# Patient Record
Sex: FEMALE | Race: WHITE | Employment: UNEMPLOYED | URBAN - METROPOLITAN AREA
[De-identification: names, ages, dates, MRNs, and addresses within clinical notes are randomized per-mention and may not be internally consistent; named-entity substitution may affect disease eponyms.]

---

## 2020-09-25 ENCOUNTER — HOSPITAL ENCOUNTER (OUTPATIENT)
Dept: RADIATION ONCOLOGY | Age: 75
Discharge: HOME | End: 2020-09-25
Payer: MEDICARE

## 2020-09-25 VITALS
SYSTOLIC BLOOD PRESSURE: 176 MMHG | HEART RATE: 72 BPM | TEMPERATURE: 98 F | OXYGEN SATURATION: 95 % | DIASTOLIC BLOOD PRESSURE: 80 MMHG | WEIGHT: 164 LBS | RESPIRATION RATE: 18 BRPM

## 2020-09-25 PROCEDURE — 99202 OFFICE O/P NEW SF 15 MIN: CPT | Performed by: RADIOLOGY

## 2020-09-25 PROCEDURE — 99204 OFFICE O/P NEW MOD 45 MIN: CPT | Performed by: RADIOLOGY

## 2020-09-25 RX ORDER — FAMOTIDINE 20 MG/1
20 TABLET, FILM COATED ORAL 2 TIMES DAILY PRN
COMMUNITY

## 2020-09-25 RX ORDER — FLUTICASONE PROPIONATE 50 MCG
1 SPRAY, SUSPENSION (ML) NASAL DAILY
COMMUNITY

## 2020-09-25 RX ORDER — SIMVASTATIN 20 MG
20 TABLET ORAL NIGHTLY
COMMUNITY

## 2020-09-25 RX ORDER — CITALOPRAM 20 MG/1
20 TABLET ORAL DAILY
COMMUNITY

## 2020-09-25 RX ORDER — MECLIZINE HCL 12.5 MG/1
12.5 TABLET ORAL
COMMUNITY

## 2020-09-25 NOTE — PROGRESS NOTES
Scottbelkys [place]  9/25/2020    Chaperone: No    Advanced Directives     Power of  Living Will    Not on File Not on File          Temp Readings from Last 2 Encounters:   09/25/20 98 °F (36.7 °C) (Infrared)     BP Readings from Last 2 Encounters:   09/25/20 (!) 176/80     Pulse Readings from Last 2 Encounters:   09/25/20 72        Wt Readings from Last 3 Encounters:   09/25/20 164 lb (74.4 kg)        Mediport: no    Pacemaker/ICD: no    Previous XRT: no    No Known Allergies       Current Outpatient Medications:     citalopram (CELEXA) 20 MG tablet, Take 20 mg by mouth daily, Disp: , Rfl:     fluticasone (FLONASE) 50 MCG/ACT nasal spray, 1 spray by Each Nostril route daily, Disp: , Rfl:     meclizine (ANTIVERT) 12.5 MG tablet, Take 12.5 mg by mouth, Disp: , Rfl:     famotidine (PEPCID) 20 MG tablet, Take 20 mg by mouth 2 times daily as needed, Disp: , Rfl:     simvastatin (ZOCOR) 20 MG tablet, Take 20 mg by mouth nightly, Disp: , Rfl:       ADDITIONAL COMMENTS: Seen today in consult with Dr. Kala Crum. Will bring patient back in for simulation after follow up with surgeon on 9/30/20.       Anya ROCHE(R)(T)

## 2020-10-13 ENCOUNTER — HOSPITAL ENCOUNTER (OUTPATIENT)
Dept: RADIATION ONCOLOGY | Age: 75
Discharge: HOME | End: 2020-10-13
Attending: RADIOLOGY
Payer: MEDICARE

## 2020-10-13 ENCOUNTER — HOSPITAL ENCOUNTER (OUTPATIENT)
Dept: CT IMAGING | Age: 75
Discharge: HOME | End: 2020-10-13
Payer: MEDICARE

## 2020-10-13 PROCEDURE — 77290 THER RAD SIMULAJ FIELD CPLX: CPT | Performed by: RADIOLOGY

## 2020-10-13 PROCEDURE — 77334 RADIATION TREATMENT AID(S): CPT | Performed by: RADIOLOGY

## 2020-10-13 PROCEDURE — 3209999900 CT GUIDE RADIATION THERAPY NO CHARGE

## 2020-10-13 PROCEDURE — 77263 THER RADIOLOGY TX PLNG CPLX: CPT | Performed by: RADIOLOGY

## 2020-10-13 PROCEDURE — C1728 CATH, BRACHYTX SEED ADM: HCPCS | Performed by: RADIOLOGY

## 2020-10-13 PROCEDURE — 77332 RADIATION TREATMENT AID(S): CPT | Performed by: RADIOLOGY

## 2020-10-15 PROCEDURE — 77295 3-D RADIOTHERAPY PLAN: CPT | Performed by: RADIOLOGY

## 2020-10-19 ENCOUNTER — HOSPITAL ENCOUNTER (OUTPATIENT)
Dept: RADIATION ONCOLOGY | Age: 75
Discharge: HOME | End: 2020-10-19
Attending: RADIOLOGY
Payer: MEDICARE

## 2020-10-19 PROCEDURE — C1717 BRACHYTX, NON-STR,HDR IR-192: HCPCS | Performed by: RADIOLOGY

## 2020-10-19 PROCEDURE — 77770 HDR RDNCL NTRSTL/ICAV BRCHTX: CPT | Performed by: RADIOLOGY

## 2020-10-19 PROCEDURE — 57156 INS VAG BRACHYTX DEVICE: CPT | Performed by: RADIOLOGY

## 2020-10-22 ENCOUNTER — HOSPITAL ENCOUNTER (OUTPATIENT)
Dept: RADIATION ONCOLOGY | Age: 75
Discharge: HOME | End: 2020-10-22
Attending: RADIOLOGY
Payer: MEDICARE

## 2020-10-22 PROCEDURE — 57156 INS VAG BRACHYTX DEVICE: CPT | Performed by: RADIOLOGY

## 2020-10-22 PROCEDURE — 77770 HDR RDNCL NTRSTL/ICAV BRCHTX: CPT | Performed by: RADIOLOGY

## 2020-10-22 PROCEDURE — C1717 BRACHYTX, NON-STR,HDR IR-192: HCPCS | Performed by: RADIOLOGY

## 2020-10-23 ENCOUNTER — APPOINTMENT (OUTPATIENT)
Dept: RADIATION ONCOLOGY | Age: 75
End: 2020-10-23
Attending: RADIOLOGY
Payer: MEDICARE

## 2020-10-26 ENCOUNTER — APPOINTMENT (OUTPATIENT)
Dept: RADIATION ONCOLOGY | Age: 75
End: 2020-10-26
Attending: RADIOLOGY
Payer: MEDICARE

## 2020-10-26 NOTE — PROGRESS NOTES
Radiation Oncology Consultation  Encounter Date: 2020    Ms. Sebastián Bennett is a 66 y.o. female  : 1945  MRN: [id number]  Acct Number: [de-identified]  Requesting Provider: Dr. Anthony Gallego    Reason for request: Uterus cancer      CONSULTANT: Sumaya Long      CHIEF COMPLAINT/DIAGNOSIS: C54.1 -- Malignant neoplasm of the endometrium; Endometroid adenocarcinoma, FIGO Grade II; T1b N0 M0, Stage IB      HISTORY OF PRESENT ILLNESS:   Rajesh Moore is a 56-year-old woman who developed postmenopausal vaginal bleeding. When this persisted after about 2 months, she sought further evaluation. Pelvic ultrasound showed a thickened endometrial stripe, and she was referred for further evaluation. Subsequent biopsy confirmed the presence of endometrioid adenocarcinoma. Surgical staging was completed in 2020 (see below). Final pathology showed a grade 2 endometrioid adenocarcinoma invading through 88% of the myometrial thickness. [place] lymph nodes were negative for malignancy; surgical margins were free of malignancy, there was no cervical stromal invasion. Based on the grade and depth of myometrial invasion, Tisha Abbott received a recommendation for adjuvant vaginal cuff brachytherapy. She is being seen today 2020 for evaluation and discussion regarding the role of radiation therapy in the management of her early stage uterine cancer. Past Medical History:   Diagnosis Date    Anxiety     Cancer [facility])     Cerebral artery occlusion with cerebral infarction ([facility] [address]     COPD (chronic obstructive pulmonary disease) (HCC)     Heartburn     Hyperlipidemia     Pulmonary disease     Seasonal allergies     Smoker     Vertigo         Past Surgical History:   Procedure Laterality Date    APPENDECTOMY      COLONOSCOPY      HYSTERECTOMY         No family history on file. Social History     Socioeconomic History    Marital status:       Spouse name: Not on file    Number of children: Not on file    Years of education: Not on file    Highest education level: Not on file   Occupational History    Not on file   Social Needs    Financial resource strain: Not on file    Food insecurity     Worry: Not on file     Inability: Not on file    Transportation needs     Medical: Not on file     Non-medical: Not on file   Tobacco Use    Smoking status: Smoker, Current Status Unknown     Years: 30.00     Types: Cigarettes   Substance and Sexual Activity    Alcohol use: Not Currently    Drug use: Not on file    Sexual activity: Not on file   Lifestyle    Physical activity     Days per week: Not on file     Minutes per session: Not on file    Stress: Not on file   Relationships    Social connections     Talks on phone: Not on file     Gets together: Not on file     Attends Restorationism service: Not on file     Active member of club or organization: Not on file     Attends meetings of clubs or organizations: Not on file     Relationship status: Not on file    Intimate partner violence     Fear of current or ex partner: Not on file     Emotionally abused: Not on file     Physically abused: Not on file     Forced sexual activity: Not on file   Other Topics Concern    Not on file   Social History Narrative    Not on file     Exposure to Industrial/ environmental Carcinogens: no      ALLERGIES: No Known Allergies       Current Outpatient Medications   Medication Sig Dispense Refill    citalopram (CELEXA) 20 MG tablet Take 20 mg by mouth daily      fluticasone (FLONASE) 50 MCG/ACT nasal spray 1 spray by Each Nostril route daily      meclizine (ANTIVERT) 12.5 MG tablet Take 12.5 mg by mouth      famotidine (PEPCID) 20 MG tablet Take 20 mg by mouth 2 times daily as needed      simvastatin (ZOCOR) 20 MG tablet Take 20 mg by mouth nightly       No current facility-administered medications for this encounter.       No outpatient medications have been marked as taking for the 9/25/20 encounter [facility] Encounter) with Wesley Ojeda MD.          LABORATORY STUDIES:   8/29/2020: H&H:  Hemoglobin: 14.2  Hematocrit: 41.1       PATHOLOGY:   9/3/2020: Surgical pathology:  Procedure: Total hysterectomy, bilateral salpingo-oophorectomy and sentinel lymph node biopsy. Histologic type: Endometrioid carcinoma, NOS. Histologic grade: FIGO grade 2. Myometrial invasion:  Depth of myometrial invasion: 15 mm  Myometrial thickness: 17 mm  Percentage of myometrial invasion: 88%  Uterine serosal involvement: Not identified  Cervical stromal involvement: Not identified    Other tissue/organ involvement: Not identified  Margins:  Ectocervical/vaginal cuff margin: Uninvolved by carcinoma  Parametrial/paracervical margin: Uninvolved by carcinoma  Lymphovascular invasion: Not identified  Regional lymph nodes: 2 of 2 pelvic sentinel lymph nodes negative for metastatic carcinoma. Pathologic staging: pT1b pN0 pMx    9/3/2020: Cytology:  Pelvic washing, cytology: Negative for malignant cells      RADIOLOGIC STUDIES:   7/24/2020: Chest x-ray: Upright PA and lateral views of the chest.  No acute pathology. COPD. Small amount of basilar scarring. REVIEW OF SYSTEMS:    Constitutional: Denies fever, chills, unintentional weight change. HEENT: Denies hearing or vision change. Denies dysphagia or odynophagia. Respiratory: Denies worsening dyspnea. Denies hemoptysis, coughing, wheezing or sputum production. Cardiovascular: Chest pain, palpitations, syncope. GI: Denies nausea or vomiting, hematemesis or rectal bleeding. Denies change in bowel habits. : Denies hematuria or dysuria. Musculoskeletal: DJD  Extremities: Maintains extremity ROM  Metabolic/endocrine: menopausal  Neurological: Denies seizures, fainting or tremors. Integument: Denies rashes or ulcerations. PHYSICAL EXAMINATION:   VITAL SIGNS: weight: 164 pounds. Temperature: 36.7C. Pulse: 72.  Respirations: 18.  Blood pressure: 176/80.   Pulse oximetry: O2 saturation 95% on room air. ECOG PERFORMANCE STATUS: 1  PAIN RATIN-2  GENERAL: Pleasant well-developed adult female. In no acute distress. Alert and oriented ×3; clear mentation with appropriate affect  SKIN: Warm and dry, without jaundice, ecchymoses, or petechiae. HEENT: Normocephalic, atraumatic. PERRL/EOMI; ears, nose and lips unremarkable on external examination; oral cavity/oropharyngeal mucosa moist without lesion or exudate  NECK:  No JVD; no palpable cervical adenopathy. THORAX: No palpable supraclavicular or axillary adenopathy;  LUNGS: clear    CARDIAC: non-tachycardic  ABDOMEN: Soft, nontender, bowel sounds present  PELVIS/RECTAL: external genitalia within normal limits. None vaginal speculum examination (performed very gently with a small speculum) the vaginal cuff appears to be healing well with no evidence of dehiscence, infection of her postoperative complication. There is some blood pooled in the vaginal vault. EXTREMITIES: no clubbing or cyanosis  NEUROLOGIC: No grossly apparent focal deficits. Cranial nerves grossly intact      IMPRESSION:  1. FIGO Grade 2 Stage I endometrioid adenocarcinoma of the uterus with invasion into the outer half of the myometrium as described above. 2. The diagnosis, including AJCC and FIGO staging was reviewed. Miguelito Corado received a copy of her staging information. 3. Treatment options and recommendations, including current clinical practice guidelines (NCCN) were reviewed in detail. This included review of the initial diagnostic workup, and recommendations regarding initial and adjuvant therapy. As noted above, given the grade and depth of invasion, the recommendation is for the vaginal cuff as the preferred option, though observation is also a consideration. Due to the low morbidity associated with vaginal cuff brachytherapy, I am strongly in favor of the adjuvant treatment.  Miguelito Corado received a copy of the clinical practice guideline pertaining to her situation. 4. Details of the radiation therapy were reviewed. This included review of the nature/mode of action of high-dose rate brachytherapy, multiple steps involved in planning for the treatment, logistics of treatment including expected number of treatments and expected amount of time in the department for each treatment. We also reviewed the expected and potential short and long-term side effects and risks of treatment. Corin Sutton had the opportunity to ask questions, and indicated that her questions were satisfactorily addressed. She also indicated that she understood the discussion, and wishes to proceed with the recommended treatment at the appropriate time. PLAN:  1. Schedule CT simulation when Robb Washburn is cleared for initiation of her treatment. 2. Schedule nursing teaching. 3. Continue care in gynecologic oncology, and with other physicians/providers. 4. Continue surveillance and basic/preventive/supportive health care in accordance with clinical practice guidelines. Thank you for allowing my assistance in Robb Burnett's care. Mary Carrillo MD   Radiation Oncology     ATTESTATION: 90 minutes spent actively reviewing patient data; 60 minutes face-to-face time,  >50% spent in counseling/discussion/education. CC: Ja Franco;   [place]: Tumor Registry: [facility] and [facility] Mirtha Son      This document was created using a voice-recognition program.  Computer generated transcription errors may be present.

## 2020-10-27 ENCOUNTER — HOSPITAL ENCOUNTER (OUTPATIENT)
Dept: RADIATION ONCOLOGY | Age: 75
Discharge: HOME | End: 2020-10-27
Attending: RADIOLOGY
Payer: MEDICARE

## 2020-10-27 PROCEDURE — 57156 INS VAG BRACHYTX DEVICE: CPT | Performed by: RADIOLOGY

## 2020-10-27 PROCEDURE — C1717 BRACHYTX, NON-STR,HDR IR-192: HCPCS | Performed by: RADIOLOGY

## 2020-10-27 PROCEDURE — 77770 HDR RDNCL NTRSTL/ICAV BRCHTX: CPT | Performed by: RADIOLOGY

## 2020-10-30 ENCOUNTER — HOSPITAL ENCOUNTER (OUTPATIENT)
Dept: RADIATION ONCOLOGY | Age: 75
Discharge: HOME | End: 2020-10-30
Attending: RADIOLOGY
Payer: MEDICARE

## 2020-10-30 PROCEDURE — C1717 BRACHYTX, NON-STR,HDR IR-192: HCPCS | Performed by: RADIOLOGY

## 2020-10-30 PROCEDURE — 77770 HDR RDNCL NTRSTL/ICAV BRCHTX: CPT | Performed by: RADIOLOGY

## 2020-10-30 PROCEDURE — 57156 INS VAG BRACHYTX DEVICE: CPT | Performed by: RADIOLOGY

## 2020-11-04 ENCOUNTER — HOSPITAL ENCOUNTER (OUTPATIENT)
Dept: RADIATION ONCOLOGY | Age: 75
Discharge: HOME | End: 2020-11-04
Attending: RADIOLOGY
Payer: MEDICARE

## 2020-11-04 PROCEDURE — 77770 HDR RDNCL NTRSTL/ICAV BRCHTX: CPT | Performed by: RADIOLOGY

## 2020-11-04 PROCEDURE — 77336 RADIATION PHYSICS CONSULT: CPT | Performed by: RADIOLOGY

## 2020-11-04 PROCEDURE — C1717 BRACHYTX, NON-STR,HDR IR-192: HCPCS | Performed by: RADIOLOGY

## 2020-11-04 PROCEDURE — 57156 INS VAG BRACHYTX DEVICE: CPT | Performed by: RADIOLOGY

## 2020-11-05 PROCEDURE — 57156 INS VAG BRACHYTX DEVICE: CPT | Performed by: RADIOLOGY

## 2020-11-05 PROCEDURE — 77770 HDR RDNCL NTRSTL/ICAV BRCHTX: CPT | Performed by: RADIOLOGY

## 2021-01-08 NOTE — PROGRESS NOTES
RADIATION ONCOLOGY [address] [facility], [address]  Ph. [phone]  Fax [phone]    PATIENT: Lexus Felipe  : 1945  MRN: [id number]  DATE: 2020      DIAGNOSIS: C54.1 -- Malignant neoplasm of the endometrium; Endometroid adenocarcinoma, FIGO Grade II; T1b N0 M0, Stage IB      Treatment Course Number: 1    Treatment Site (s) Modality Dose (cGy) From To Fractions/  Elapsed Days   Vaginal Cuff Ir192 3000 cGy (0.3 cm depth) 10/19/2020 2020 5/ 16     Cumulative Dose to Vaginal Cuff: 3000 cGy (at 0.3 cm depth)  Treatment Modifiers: Three-dimensional brachytherapy planning; high dose rate intracavitary brachytherapy utilizing a CT compatible segmented vaginal cylinder applicator. HISTORY OF PRESENT ILLNESS:   Brenda Nuno is a 78-year-old woman who developed postmenopausal vaginal bleeding. When this persisted after about 2 months, she sought further evaluation. Pelvic ultrasound showed a thickened endometrial stripe, and she was referred for further evaluation. Subsequent biopsy confirmed the presence of endometrioid adenocarcinoma. Surgical staging was completed in 2020 (see below). Final pathology showed a grade 2 endometrioid adenocarcinoma invading through 88% of the myometrial thickness. [place] lymph nodes were negative for malignancy; surgical margins were free of malignancy, there was no cervical stromal invasion. Based on the grade and depth of myometrial invasion, Tanya Schultz received a recommendation for adjuvant vaginal cuff brachytherapy. She was seen 2020 for evaluation and discussion regarding the role of radiation therapy in the management of her early stage uterine cancer. With clinical practice guidelines, Tanya Schultz received a recommendation for adjuvant vaginal cuff brachytherapy.   After reviewing the details of the recommended treatment, she was agreeable to proceeding. Pain Ratin/10  ECOG Performance Status: 0-1      Treatment Tolerance/Response:   Morgan Valdez tolerated her radiation therapy generally well. She did have some mild rectal irritability towards the latter part of her treatment course. She did not have any unexpected side effects during her radiation therapy course. Systemic Therapy: None currently      Follow Up Plan:   Morgan Valdez received posttreatment instructions regarding skin/vaginal mucosal care. She is scheduled to return for a checkup in a month. We also will further discuss use of a vaginal dilator and provide her with a dilator for her use. As usual, Morgan Valdez was instructed to call and arrange for an earlier appointment if needed for any problems, questions or concerns. She will continue care in gynecologic oncology and with her other physicians/providers. Rosemary Crenshaw MD   Radiation Oncology    CC: Ja Kirk;   [facility]: Tumor Registry: [facility] and Jemima Stafford      This document was created using a voice-recognition program.  Computer generated transcription errors may be present.

## 2021-01-19 ENCOUNTER — HOSPITAL ENCOUNTER (OUTPATIENT)
Dept: RADIATION ONCOLOGY | Age: 76
Discharge: HOME | End: 2021-01-19
Attending: RADIOLOGY
Payer: MEDICARE

## 2021-01-19 VITALS
OXYGEN SATURATION: 93 % | RESPIRATION RATE: 16 BRPM | SYSTOLIC BLOOD PRESSURE: 189 MMHG | HEART RATE: 82 BPM | WEIGHT: 163.8 LBS | TEMPERATURE: 97.6 F | DIASTOLIC BLOOD PRESSURE: 79 MMHG

## 2021-01-19 PROCEDURE — 99212 OFFICE O/P EST SF 10 MIN: CPT | Performed by: RADIOLOGY

## 2021-01-19 NOTE — PROGRESS NOTES
Lupe Parson is a 49-year-old woman who developed postmenopausal vaginal bleeding.  When this persisted after about 2 months, she sought further evaluation.  Pelvic ultrasound showed a thickened endometrial stripe, and she was referred for further evaluation.  Subsequent biopsy confirmed the presence of endometrioid adenocarcinoma.  Surgical staging was completed in September 2020 (see below).  Final pathology showed a grade 2 endometrioid adenocarcinoma invading through 88% of the myometrial thickness.  [place] lymph nodes were negative for malignancy; surgical margins were free of malignancy, there was no cervical stromal invasion.  Based on the grade and depth of myometrial invasion, Kristy Bhagat received a recommendation for adjuvant vaginal cuff brachytherapy. Piper Sanders was seen 9/25/2020 for evaluation and discussion regarding the role of radiation therapy in the management of her early stage uterine cancer. With clinical practice guidelines, Kristy Bhagat received a recommendation for adjuvant vaginal cuff brachytherapy. After reviewing the details of the recommended treatment, she was agreeable to proceeding. Kristy Bhagat tolerated her radiation therapy generally well. She did have some mild rectal irritability towards the latter part of her treatment course. She did not have any unexpected side effects during her radiation therapy course.       INTERVAL HISTORY: ***      TESTS:  No current for review      MEDICATIONS:   Current Outpatient Medications   Medication Sig Dispense Refill    lisinopril (PRINIVIL;ZESTRIL) 20 MG tablet Take 20 mg by mouth daily      citalopram (CELEXA) 20 MG tablet Take 20 mg by mouth daily      fluticasone (FLONASE) 50 MCG/ACT nasal spray 1 spray by Each Nostril route daily      meclizine (ANTIVERT) 12.5 MG tablet Take 12.5 mg by mouth      famotidine (PEPCID) 20 MG tablet Take 20 mg by mouth 2 times daily as needed      simvastatin (ZOCOR) 20 MG tablet Take 20 mg by mouth nightly No current facility-administered medications for this encounter. REVIEW OF SYSTEMS:      EXAMINATION:   GENERAL: Well-developed adult ***, alert and oriented ×3 in no obvious distress. Clear mentation with appropriate affect. VITAL SIGNS:  ***  PAIN: ***  ECOG: ***  HEENT: Atraumatic, normocephalic. PERRL/EOMI; ears, nose and lips unremarkable on external examination; oral cavity within normal limits***  NECK: No JVD. No palpable cervical lymphadenopathy. ***  THORAX: No palpable supraclavicular or axillary lymphadenopathy. ***  LUNGS: Clear to auscultation. ***  HEART: Non-tachycardic, regular***  BREASTS: ***  ABDOMEN: Soft, nondistended, nontender with unremarkable bowel sounds. ***  EXTREMITIES: No clubbing or cyanosis. ***  NEUROLOGIC EXAMINATION: Cranial nerves grossly intact. No obvious focal neurologic deficits. ***  SKIN: ***      ASSESSMENT:   1. Recuperated well from HDR brachytherapy adjuvant treatment after undergoing definitive surgery for uterus cancer. 2.  No unexpected side effects related to radiation therapy. PLAN:   1. Radiation oncology follow-up in 6 months. As usual, Srinath Purdy was instructed to call and arrange for an earlier appointment if needed for any problems, questions or concerns. 2.  Dispense vaginal dilator with instructions for use. 3.  Continue care in gynecologic oncology and with other physicians/providers. 4.  Continue surveillance and basic/supportive/preventive health care in accordance with clinical practice guidelines. Ebony Mac MD   Radiation Oncology    CC: Ja Brunson  ACC: Tumor Registry: [facility] and Arron Son    This document was created using a voice-recognition program.  Computer generated transcription errors may be present.